# Patient Record
Sex: MALE | Race: WHITE | NOT HISPANIC OR LATINO | URBAN - NONMETROPOLITAN AREA
[De-identification: names, ages, dates, MRNs, and addresses within clinical notes are randomized per-mention and may not be internally consistent; named-entity substitution may affect disease eponyms.]

---

## 2018-04-04 NOTE — PROCEDURE NOTE: CLINICAL
PROCEDURE NOTE: SLT OD. Diagnosis: POAG, Mild. Anesthesia: Topical. Prep: Alphagan 0.15%. Prior to treatment, risks/benefits/alternatives discussed including infection, loss of vision, hemorrhage, cataract, glaucoma, retinal tears or detachment. Lens:  SLT laser lens with goniosol. Power: 0.8mJ. Total applications: 966. Application 249 degrees. Patient tolerated procedure well. There were no complications. Post-op instructions given. Post-op IOP = 32 mmHg. No White

## 2018-05-16 NOTE — PROCEDURE NOTE: CLINICAL
PROCEDURE NOTE: SLT #1 OS. Diagnosis: POAG, Mild. Prior to treatment, risks/benefits/alternatives discussed including infection, loss of vision, hemorrhage, cataract, glaucoma, retinal tears or detachment. Lens:  SLT laser lens with goniosol. Power: 1.2mJ. Total applications: 514. Application 335 degrees. Patient tolerated procedure well. There were no complications. Post-op instructions given. Post-op IOP = * mmHg. Clara Coulter

## 2019-04-15 ENCOUNTER — IMPORTED ENCOUNTER (OUTPATIENT)
Dept: URBAN - NONMETROPOLITAN AREA CLINIC 1 | Facility: CLINIC | Age: 49
End: 2019-04-15

## 2019-04-15 PROBLEM — H52.13: Noted: 2019-04-15

## 2019-04-15 PROBLEM — H52.4: Noted: 2019-04-15

## 2019-04-15 PROBLEM — E11.9: Noted: 2019-04-15

## 2019-04-15 PROBLEM — H52.223: Noted: 2019-04-15

## 2019-04-15 PROCEDURE — 92015 DETERMINE REFRACTIVE STATE: CPT

## 2019-04-15 PROCEDURE — 92004 COMPRE OPH EXAM NEW PT 1/>: CPT

## 2019-04-15 NOTE — PATIENT DISCUSSION
DM s -Stressed the importance of keeping blood sugars under control blood pressure under control and weight normalization and regular visits with PCP. -Explained the possible effects of poorly controlled diabetes and the damage that diabetes can cause to ocular health. -Patient to check HgbA1C.-Pt instructed to contact our office with any vision changes. Myopia-Discussed diagnosis with patient. -Explained that people who are myopic are at a higher risk for developing RD/RT and reviewed associated S&S.-Pt to contact our office if symptoms develop. Astigmatism-Discussed diagnosis with patient. Presbyopia-Discussed diagnosis with patient. Updated spec Rx given.   Recommend lens that will provide comfort as well as protect safety and health of eyes.; Dr's Notes: [facility]  PCP Charlie Chung NP

## 2019-06-26 NOTE — PATIENT DISCUSSION
Glaucoma stable.  ed IOP can be fluctuating and is not a constant number.  Ed needs updated HVF to determine whether needs to continue drops but I recommend she start since she is leaving [facility] within the next week for about 3 months.  ed see [address] to repeat HVF OU and decide on repeat SLT OU vs cont with drops.

## 2019-10-16 NOTE — PATIENT DISCUSSION
Glaucoma stable.  ed IOP can be fluctuating and is not a constant number.  Ed needs updated HVF to determine whether needs to continue drops but I recommend she start since she is leaving [facility] within the next week for about 3 months.  ed see [address] to repeat HVF OU and decide on repeat SLT OU vs cont with drops.

## 2019-10-16 NOTE — PATIENT DISCUSSION
10/16/19 IOP still too high.  full discussion on gtts vs SLT.   pt trouble remembering drops will work on this.  ed option qhs but concerned with eye color changes.  will work on consistency otherwise can consider SLT.

## 2019-11-13 NOTE — PATIENT DISCUSSION
Glaucoma stable.  ed IOP can be fluctuating and is not a constant number.  Ed needs updated HVF to determine whether needs to continue drops but I recommend she start since she is leaving [facility] within the next week for about 3 months.  ed see [address] to repeat HVF OU and decide on repeat SLT OU vs cont with drops.

## 2019-11-13 NOTE — PATIENT DISCUSSION
11/13/19 IOP better with strict compliance over the last month.  PT ed in order to stabilize IOP and prevent VF loss CPM with consistency.

## 2020-03-13 NOTE — PATIENT DISCUSSION
Glaucoma stable.  ed IOP can be fluctuating and is not a constant number.  Ed needs updated HVF to determine whether needs to continue drops but I recommend she start since she is leaving [facility] within the next week for about 3 months.  ed see [address] to repeat HVF OU and decide on repeat SLT OU vs cont with drops.

## 2020-03-13 NOTE — PATIENT DISCUSSION
03/13/20.  did NOT use gtts this am.  ed take drops ALWAYS as I want to see how gtts are affecting IOPs.   CPM.

## 2020-10-28 NOTE — PATIENT DISCUSSION
Glaucoma stable.  ed IOP can be fluctuating and is not a constant number.  Ed needs updated HVF to determine whether needs to continue drops but I recommend she start since she is leaving [facility] within the next week for about 3 months.  ed see [address] to repeat HVF OU and decide on repeat SLT OU vs cont with drops.

## 2020-10-28 NOTE — PATIENT DISCUSSION
10/28/20: VF not reliable today and ONHs do not support.  will repeat and monitor IOP in a few months.

## 2020-11-10 ENCOUNTER — IMPORTED ENCOUNTER (OUTPATIENT)
Dept: URBAN - NONMETROPOLITAN AREA CLINIC 1 | Facility: CLINIC | Age: 50
End: 2020-11-10

## 2020-11-10 PROCEDURE — 92014 COMPRE OPH EXAM EST PT 1/>: CPT

## 2020-11-10 PROCEDURE — 92015 DETERMINE REFRACTIVE STATE: CPT

## 2020-11-10 NOTE — PATIENT DISCUSSION
DM s -Stressed the importance of keeping blood sugars under control blood pressure under control and weight normalization and regular visits with PCP. -Explained the possible effects of poorly controlled diabetes and the damage that diabetes can cause to ocular health. -Patient to check HgbA1C.-Pt instructed to contact our office with any vision changes. Myopia-Discussed diagnosis with patient. -Explained that people who are myopic are at a higher risk for developing RD/RT and reviewed associated S&S.-Pt to contact our office if symptoms develop. Astigmatism-Discussed diagnosis with patient. Presbyopia-Discussed diagnosis with patient. Updated spec Rx given.   Recommend lens that will provide comfort as well as protect safety and health of eyes.; Dr's Notes: [facility]  PCP Garland Gutierrez NP

## 2021-03-03 NOTE — PATIENT DISCUSSION
Glaucoma stable.  ed IOP can be fluctuating and is not a constant number.  Ed needs updated HVF to determine whether needs to continue drops but I recommend she start since she is leaving [facility] within the next week for about 3 months.  ed see [facility] to repeat HVF OU and decide on repeat SLT OU vs cont with drops.

## 2021-10-27 NOTE — PATIENT DISCUSSION
10/27/21:  IOP and disc/RNFL stable.  CPM.  rec istent with IOLs.   consult [address] when ready as is at [facility] with current glasses.

## 2021-10-27 NOTE — PATIENT DISCUSSION
Glaucoma stable.  ed IOP can be fluctuating and is not a constant number.  Ed needs updated HVF to determine whether needs to continue drops but I recommend she start since she is leaving [facility] within the next week for about 3 months.  ed see [address] to repeat HVF OU and decide on repeat SLT OU vs cont with drops.

## 2021-10-28 NOTE — PATIENT DISCUSSION
PT HAS A H/O POOR COMPLIANCE W/ [facility] GTTS.  REC:  ISTENT VS HYDRUS OU AT TIME OF CE/IOL FOR ADDED IOP CONTROL.

## 2021-11-15 NOTE — PATIENT DISCUSSION
Discussed the risks/benefits of YAG Laser Capsulotomy. elevated INR 3.04, PTT, PT, thrombocytopenia. overall coagulopathy likely in setting of cirrhosis.   - s/p vit K 5 IV   - will continue PO vit K 10mg daily x 3 days  - monitor for signs of bleeding

## 2022-04-15 ASSESSMENT — VISUAL ACUITY
OU_SC: 20/20
OD_SC: 20/20
OS_SC: 20/25
OD_SC: 20/30
OS_SC: 20/50
OU_CC: J1+
OS_CC: 20/30
OD_CC: 20/30

## 2022-04-15 ASSESSMENT — TONOMETRY
OD_IOP_MMHG: 17
OD_IOP_MMHG: 18
OS_IOP_MMHG: 18
OS_IOP_MMHG: 18

## 2022-04-27 ENCOUNTER — COMPREHENSIVE EXAM (OUTPATIENT)
Dept: RURAL CLINIC 1 | Facility: CLINIC | Age: 52
End: 2022-04-27

## 2022-04-27 DIAGNOSIS — H52.223: ICD-10-CM

## 2022-04-27 DIAGNOSIS — H52.13: ICD-10-CM

## 2022-04-27 DIAGNOSIS — E11.9: ICD-10-CM

## 2022-04-27 DIAGNOSIS — H52.4: ICD-10-CM

## 2022-04-27 PROCEDURE — 92015 DETERMINE REFRACTIVE STATE: CPT

## 2022-04-27 PROCEDURE — 92014 COMPRE OPH EXAM EST PT 1/>: CPT

## 2022-04-27 ASSESSMENT — TONOMETRY
OS_IOP_MMHG: 17
OD_IOP_MMHG: 17

## 2022-04-27 ASSESSMENT — VISUAL ACUITY
OS_CC: 20/40
OU_CC: 20/25
OD_CC: 20/25

## 2022-04-27 NOTE — PATIENT DISCUSSION
4/27/2022: IOP much better after IOL with stents.  CPM as IOP at this level keeps long term risk of vision loss much lower.

## 2022-10-31 NOTE — PROCEDURE NOTE: CLINICAL
PROCEDURE NOTE: Excision of Eyelid Lesion Right Upper Lid. Diagnosis: Eyelid Lesion, Benign. Anesthesia: Topical. Prep: Betadine Scrub. Risks, benefits and alternatives discussed. Patient desires to proceed with excision of growth/lesion today. A drop of proparacaine was instilled in the involved eye. A tetracaine drop was used on a cotton tipped applicator and placed on the conjunctiva. The involved area was anesthetized using 1% lidocaine with epi. The lesion was excised using mini Westcotts and forceps and discarded. The wound was cauterized to achieve hemostasis. Erythromycin ophthalmic ointment was applied. Post op instructions were given to the patient. The patient tolerated the procedure well and left in good condition. The patient understands to call us for any concerns. Jacky Hopper PROCEDURE NOTE: Excision of Eyelid Lesion Left Upper Lid. Diagnosis: Eyelid Lesion, Benign. Anesthesia: Topical. Prep: Betadine Scrub. Risks, benefits and alternatives discussed. Patient desires to proceed with excision of growth/lesion today. A drop of proparacaine was instilled in the involved eye. A tetracaine drop was used on a cotton tipped applicator and placed on the conjunctiva. The involved area was anesthetized using 1% lidocaine with epi. The lesion was excised using mini Westcotts and forceps and discarded. The wound was cauterized to achieve hemostasis. Erythromycin ophthalmic ointment was applied. Post op instructions were given to the patient. The patient tolerated the procedure well and left in good condition. The patient understands to call us for any concerns. Jacky Hopper
